# Patient Record
Sex: MALE | Race: WHITE | NOT HISPANIC OR LATINO | URBAN - METROPOLITAN AREA
[De-identification: names, ages, dates, MRNs, and addresses within clinical notes are randomized per-mention and may not be internally consistent; named-entity substitution may affect disease eponyms.]

---

## 2019-11-12 ENCOUNTER — EMERGENCY (EMERGENCY)
Facility: HOSPITAL | Age: 19
LOS: 1 days | Discharge: HOME | End: 2019-11-12
Attending: EMERGENCY MEDICINE
Payer: COMMERCIAL

## 2019-11-12 VITALS
SYSTOLIC BLOOD PRESSURE: 116 MMHG | DIASTOLIC BLOOD PRESSURE: 76 MMHG | OXYGEN SATURATION: 100 % | WEIGHT: 179.9 LBS | HEIGHT: 72 IN | TEMPERATURE: 98 F | HEART RATE: 77 BPM | RESPIRATION RATE: 16 BRPM

## 2019-11-12 PROCEDURE — 99282 EMERGENCY DEPT VISIT SF MDM: CPT

## 2019-11-12 NOTE — ED PROVIDER NOTE - CLINICAL SUMMARY MEDICAL DECISION MAKING FREE TEXT BOX
R toe lacerations without evidence of superinfection. Patient stable with normal exam. Plan to continue supportive care (RICE) and F/U with podiatry. R toe lacerations without evidence of superinfection. Patient stable with normal exam. Plan to continue supportive care (RICE) and F/U with podiatry.    Marybeth Esteves MD - Attending Physician: Pt here with R toe swelling s/p fracture/laceration, sent in by . No signs of infection, pain improved, ambulatory. Discussed elevation, ice, f/u with Podiatry

## 2019-11-12 NOTE — ED PROVIDER NOTE - NSFOLLOWUPINSTRUCTIONS_ED_ALL_ED_FT
Your toe incisions are healing well and do not need antibiotics at this time. Call the podiatry team to schedule a follow up appointment with them. If you develop worsening pain, fevers, or leakage of pus from your cuts, see a doctor.

## 2019-11-12 NOTE — ED ADULT NURSE NOTE - CHPI ED NUR SYMPTOMS NEG
no fever/no inflammation/no bleeding/no pain/no purulent drainage/no rectal pain/no redness/no drainage/no bleeding at site/no chills

## 2019-11-12 NOTE — ED PROVIDER NOTE - MUSCULOSKELETAL, MLM
Spine appears normal, range of motion is not limited, no muscle or joint tenderness. R 4th toe and medial 5th toe with well-healing sutured incisions without streaking erythema or drainage. Spine appears normal, range of motion is not limited, no muscle or joint tenderness. R 4th toe and medial 5th toe with well-healing sutured incisions without streaking erythema or drainage, Edema to dorsum of R foot extending to R lateral foot with some dependent bruising. No tenderness. No warmth. FROM ankle and toes.

## 2019-11-12 NOTE — ED ADULT NURSE NOTE - NSIMPLEMENTINTERV_GEN_ALL_ED
Implemented All Universal Safety Interventions:  [place] to call system. Call bell, personal items and telephone within reach. Instruct patient to call for assistance. Room bathroom lighting operational. Non-slip footwear when patient is off stretcher. Physically safe environment: no spills, clutter or unnecessary equipment. Stretcher in lowest position, wheels locked, appropriate side rails in place.

## 2019-11-12 NOTE — ED PROVIDER NOTE - ATTENDING CONTRIBUTION TO CARE
Marybeth Esteves MD - Attending Physician: I have personally seen and examined this patient with the resident/fellow.  I have fully participated in the care of this patient. I have reviewed all pertinent clinical information, including history, physical exam, plan and the Resident/Fellow’s note and agree except as noted. See MDM

## 2019-11-12 NOTE — ED PROVIDER NOTE - SKIN, MLM
Skin normal color for race, warm, dry and intact. Mild non-tender erythematous edema of lateral R foot at site of wrapping.

## 2019-11-12 NOTE — ED PROVIDER NOTE - CARE PLAN
Principal Discharge DX:	Toe laceration Principal Discharge DX:	Closed nondisplaced fracture of phalanx of lesser toe of right foot, unspecified phalanx, initial encounter

## 2019-11-12 NOTE — ED PROVIDER NOTE - PRINCIPAL DIAGNOSIS
Toe laceration Closed nondisplaced fracture of phalanx of lesser toe of right foot, unspecified phalanx, initial encounter

## 2019-11-12 NOTE — ED PROVIDER NOTE - PATIENT PORTAL LINK FT
You can access the FollowMyHealth Patient Portal offered by [facility] by registering at the following website: [email or web address]. By joining [facility]’s FollowMyHealth portal, you will also be able to view your health information using other applications (apps) compatible with our system.

## 2019-11-12 NOTE — ED PROVIDER NOTE - NSFOLLOWUPCLINICS_GEN_ALL_ED_FT
[facility]  Podiatry  [address] [place] [address] - [address] [place] [address]  Phone: [phone]  Fax:   Follow Up Time: 4-6 Days

## 2019-11-12 NOTE — ED PROVIDER NOTE - OBJECTIVE STATEMENT
Koko is a 19-year-old previously healthy young man who was referred from an urgent care for possible superinfected laceration. 4 days ago, he broke his R pinky toe (diagnosed at Urgent Care) and had lacerations over the 4th toe and on the medial side of his 5th toe repaired. They told him to follow up 10 days after that for suture removal.    He denies any fevers, purulent drainage, or worsening pain. He thinks there might be more redness, but he can't really remember and he's been walking on it more.

## 2019-11-12 NOTE — ED ADULT NURSE NOTE - OBJECTIVE STATEMENT
Pt presents for eval of sutures placed between 4th and 5th toes on right foot, as well as the 5th toe which he injured 5 days ago when he dropped a heavy object onto his foot.  No drainage present, slight redness noted over lateral aspect of dorsum of the foot, area same temperature of the rest of the foot.  Denies fever or pain at the site.

## 2022-12-31 NOTE — ED ADULT TRIAGE NOTE - AS HEIGHT TYPE
Patient  Daughter calling ( identified name and  )  Patient is leaving the country by   And requesting the refills     Med pended for your review / approval stated

## 2024-09-05 NOTE — ED ADULT NURSE NOTE - NS ED NOTE ABUSE RESPONSE YN
Yes [Dyspnea on exertion] : dyspnea during exertion [Chest Discomfort] : chest discomfort [Palpitations] : palpitations [Negative] : Heme/Lymph